# Patient Record
Sex: FEMALE | Race: WHITE | NOT HISPANIC OR LATINO | Employment: FULL TIME | URBAN - METROPOLITAN AREA
[De-identification: names, ages, dates, MRNs, and addresses within clinical notes are randomized per-mention and may not be internally consistent; named-entity substitution may affect disease eponyms.]

---

## 2022-04-21 ENCOUNTER — TELEPHONE (OUTPATIENT)
Dept: SURGERY | Facility: CLINIC | Age: 52
End: 2022-04-21

## 2022-04-21 NOTE — TELEPHONE ENCOUNTER
Attempted to contact Jessica Villanueva to discuss scheduling a consult with Dr. Payne from [facility] in regards to a Ventral Hernia.    Left voice mail to return our call at [phone] on [phone] (home).    Rg Aly MA

## 2022-07-25 ENCOUNTER — OFFICE VISIT (OUTPATIENT)
Dept: SURGERY | Facility: CLINIC | Age: 52
End: 2022-07-25
Payer: COMMERCIAL

## 2022-07-25 VITALS
WEIGHT: 280.75 LBS | SYSTOLIC BLOOD PRESSURE: 145 MMHG | DIASTOLIC BLOOD PRESSURE: 85 MMHG | OXYGEN SATURATION: 97 % | HEART RATE: 65 BPM | HEIGHT: 67 IN | BODY MASS INDEX: 44.07 KG/M2

## 2022-07-25 DIAGNOSIS — K43.2 INCISIONAL HERNIA, WITHOUT OBSTRUCTION OR GANGRENE: Primary | ICD-10-CM

## 2022-07-25 PROCEDURE — 99999 PR PBB SHADOW E&M-EST. PATIENT-LVL III: ICD-10-PCS | Mod: PBBFAC,,, | Performed by: SURGERY

## 2022-07-25 PROCEDURE — 99999 PR PBB SHADOW E&M-EST. PATIENT-LVL III: CPT | Mod: PBBFAC,,, | Performed by: SURGERY

## 2022-07-25 PROCEDURE — 99203 OFFICE O/P NEW LOW 30 MIN: CPT | Mod: S$GLB,,, | Performed by: SURGERY

## 2022-07-25 PROCEDURE — 99203 PR OFFICE/OUTPT VISIT, NEW, LEVL III, 30-44 MIN: ICD-10-PCS | Mod: S$GLB,,, | Performed by: SURGERY

## 2022-07-25 RX ORDER — NITROFURANTOIN 25; 75 MG/1; MG/1
100 CAPSULE ORAL 2 TIMES DAILY
COMMUNITY
Start: 2022-07-22

## 2022-07-25 RX ORDER — CIPROFLOXACIN AND DEXAMETHASONE 3; 1 MG/ML; MG/ML
SUSPENSION/ DROPS AURICULAR (OTIC)
COMMUNITY
Start: 2022-07-21

## 2022-07-25 RX ORDER — PANTOPRAZOLE SODIUM 20 MG/1
20 TABLET, DELAYED RELEASE ORAL
COMMUNITY

## 2022-07-25 RX ORDER — TRAZODONE HYDROCHLORIDE 50 MG/1
TABLET ORAL
COMMUNITY
Start: 2022-06-27

## 2022-07-25 RX ORDER — CELECOXIB 200 MG/1
200 CAPSULE ORAL DAILY PRN
COMMUNITY
Start: 2022-07-21

## 2022-07-25 RX ORDER — MICONAZOLE NITRATE 2 %
2 CREAM (GRAM) TOPICAL
Qty: 60 EACH | Refills: 0 | Status: SHIPPED | OUTPATIENT
Start: 2022-07-25

## 2022-07-25 NOTE — PROGRESS NOTES
"General Surgery Office Visit    SUBJECTIVE:     Chief Complaint: Ventral Hernia    History of Present Illness:  Patient is a 52 y.o. female w/ hx of midline  x 5 and at least one attempt at ventral hernia repair (states this was in ).  This resolved her issues for a few years, but her hernia came back, and lately she has been getting several SBOs a year that are related to the hernia.  All seem to have resolved without an operation.  She has been told by her primary surgeon that she "doesn't have anything to fix the mesh to", and that he would only offer surgery in an emergency.      She currently smokes 1/2 ppd  Her BMI is 44, has been trying to lose weight, but has not been successful.    Review of patient's allergies indicates:   Allergen Reactions    Latex, natural rubber        No past medical history on file.  No past surgical history on file.      Review of Systems:  ROS - Negative except above    OBJECTIVE:     Vital Signs (Most Recent)  Pulse: 65 (22 1101)  BP: (!) 145/85 (22 1101)  SpO2: 97 % (22 1101)    Physical Exam:  Physical Exam   Constitutional: She is oriented to person, place, and time. She appears well-developed and well-nourished. No distress.   HENT:   Head: Normocephalic and atraumatic.   Eyes: Pupils are equal, round, and reactive to light. Right eye exhibits no discharge. Left eye exhibits no discharge.   Cardiovascular: Normal rate and regular rhythm.   Pulmonary/Chest: Effort normal and breath sounds normal. No respiratory distress.   Abdominal:   Cannot appreciate fascial edges, but seems to have a couple different defects with large hernia sacs containing bowel.  I am unable to reduce this.     Musculoskeletal:         General: No tenderness or deformity. Normal range of motion.      Cervical back: Normal range of motion and neck supple.   Neurological: She is alert and oriented to person, place, and time.   Skin: Skin is warm and dry. Capillary refill " takes less than 2 seconds. No rash noted. She is not diaphoretic.   Psychiatric: Her behavior is normal.   Vitals reviewed.     CT: Do not have access to images, per chart review in media tab, there are three large defects containing small bowel and colon    ASSESSMENT/PLAN:   Jessica Villanueva is a 52 y.o. female w/ large incisional hernia    -She will very likely need a repair of these hernias.  Will obtain CT images to evaluate this hernia  -Advised weight loss and smoking cessation, sent in nicorette script  -Will follow up in a month    Farhat Yan MD  General Surgery, PGY-5  [phone]

## 2022-08-05 ENCOUNTER — TELEPHONE (OUTPATIENT)
Dept: SURGERY | Facility: CLINIC | Age: 52
End: 2022-08-05
Payer: COMMERCIAL

## 2022-08-05 NOTE — TELEPHONE ENCOUNTER
Patient seen by Dr. Evans on 7/25 for hernia and scheduled for follow-up appointment on 8/29. Received message requesting earlier appointment. After speaking with the patient, she states that she does not have transportation to come to [place] to see Dr. Evans but is in pain. Reports being seen in ED in [place] on 8/1/22 but is unhappy because she was not given antibiotics or pain medication. Patient states she was prescribed antibiotics at the beginning of July from outside hospital but does not know what happened to them. Patient asking for medication to help pain. Will speak with Dr. Evans regarding plan for patient and reach back out to her. Patient verbalized understanding.

## 2022-08-05 NOTE — TELEPHONE ENCOUNTER
Patient reports pain and requesting pain medication. Patient seen by Dr. Evans in July for a hernia but currently states her pain is due to open wounds on her abdomen that she is seeing wound care for. Patient informed that Dr. Evans will no be sending in prescription for pain medication and suggested that she she reach out to her wound care doctor who is currently treating her. Patient currently scheduled to follow-up with Dr. Evans for hernia at the end of August but has difficulty with transportation so she is unsure if she will be able to make her appointment. Patient informed that Dr. Evans will be leaving in September so she will likely need to follow-up with another physician. Patient states Mondays and Tuesdays are best for her but that she is waiting to hear back from insurance regarding help with transportation. Patient will call back once she figures out transportation to schedule an appointment.

## 2023-06-27 ENCOUNTER — OFFICE VISIT (OUTPATIENT)
Dept: SURGERY | Facility: CLINIC | Age: 53
End: 2023-06-27
Payer: COMMERCIAL

## 2023-06-27 VITALS
BODY MASS INDEX: 42.11 KG/M2 | DIASTOLIC BLOOD PRESSURE: 66 MMHG | SYSTOLIC BLOOD PRESSURE: 127 MMHG | WEIGHT: 268.31 LBS | HEIGHT: 67 IN | OXYGEN SATURATION: 96 % | HEART RATE: 66 BPM

## 2023-06-27 DIAGNOSIS — K43.2 RECURRENT INCISIONAL HERNIA: Primary | ICD-10-CM

## 2023-06-27 PROCEDURE — 3008F BODY MASS INDEX DOCD: CPT | Mod: CPTII,S$GLB,, | Performed by: SURGERY

## 2023-06-27 PROCEDURE — 1159F MED LIST DOCD IN RCRD: CPT | Mod: CPTII,S$GLB,, | Performed by: SURGERY

## 2023-06-27 PROCEDURE — 99999 PR PBB SHADOW E&M-EST. PATIENT-LVL III: CPT | Mod: PBBFAC,,, | Performed by: SURGERY

## 2023-06-27 PROCEDURE — 3074F SYST BP LT 130 MM HG: CPT | Mod: CPTII,S$GLB,, | Performed by: SURGERY

## 2023-06-27 PROCEDURE — 3078F DIAST BP <80 MM HG: CPT | Mod: CPTII,S$GLB,, | Performed by: SURGERY

## 2023-06-27 PROCEDURE — 99999 PR PBB SHADOW E&M-EST. PATIENT-LVL III: ICD-10-PCS | Mod: PBBFAC,,, | Performed by: SURGERY

## 2023-06-27 PROCEDURE — 3078F PR MOST RECENT DIASTOLIC BLOOD PRESSURE < 80 MM HG: ICD-10-PCS | Mod: CPTII,S$GLB,, | Performed by: SURGERY

## 2023-06-27 PROCEDURE — 1159F PR MEDICATION LIST DOCUMENTED IN MEDICAL RECORD: ICD-10-PCS | Mod: CPTII,S$GLB,, | Performed by: SURGERY

## 2023-06-27 PROCEDURE — 3008F PR BODY MASS INDEX (BMI) DOCUMENTED: ICD-10-PCS | Mod: CPTII,S$GLB,, | Performed by: SURGERY

## 2023-06-27 PROCEDURE — 99214 PR OFFICE/OUTPT VISIT, EST, LEVL IV, 30-39 MIN: ICD-10-PCS | Mod: S$GLB,,, | Performed by: SURGERY

## 2023-06-27 PROCEDURE — 3074F PR MOST RECENT SYSTOLIC BLOOD PRESSURE < 130 MM HG: ICD-10-PCS | Mod: CPTII,S$GLB,, | Performed by: SURGERY

## 2023-06-27 PROCEDURE — 99214 OFFICE O/P EST MOD 30 MIN: CPT | Mod: S$GLB,,, | Performed by: SURGERY

## 2023-06-27 NOTE — PROGRESS NOTES
History & Physical  General Surgery    SUBJECTIVE:     History of Present Illness:  Patient is a 52 y.o. female presents for evaluation of large, recurrent incisional hernia.  Has had the hernia for many years.  Complains of pain in the area.  No nausea, vomiting, constipation, or other obstructive symptoms.  Previously had it repaired and failed.  Has seen surgeons and was deemed too large incised fixed.  Has a history of morbid obesity, has lost 50 lb and is continuing to lose weight.  She is an active smoker.  Nondiabetic.      No chief complaint on file.      Review of patient's allergies indicates:   Allergen Reactions    Latex, natural rubber        Current Outpatient Medications   Medication Sig Dispense Refill    celecoxib (CELEBREX) 200 MG capsule Take 200 mg by mouth daily as needed.      ciprofloxacin-dexamethasone 0.3-0.1% (CIPRODEX) 0.3-0.1 % DrpS Place into the right ear.      nicotine, polacrilex, (NICORETTE) 2 mg Gum Take 1 each (2 mg total) by mouth as needed (cravings). 60 each 0    nitrofurantoin, macrocrystal-monohydrate, (MACROBID) 100 MG capsule Take 100 mg by mouth 2 (two) times daily.      pantoprazole (PROTONIX) 20 MG tablet Take 20 mg by mouth.      traZODone (DESYREL) 50 MG tablet Take by mouth.       No current facility-administered medications for this visit.       No past medical history on file.  No past surgical history on file.  No family history on file.        Review of Systems:  Review of Systems   Constitutional:  Negative for chills and fever.   Respiratory:  Negative for cough and shortness of breath.    Cardiovascular:  Negative for chest pain and palpitations.   Gastrointestinal:  Positive for abdominal pain. Negative for nausea and vomiting.   Genitourinary:  Negative for dysuria and hematuria.   Musculoskeletal:  Negative for back pain and gait problem.   Skin:  Negative for color change, rash and wound.   Neurological:  Negative for weakness and headaches.   Hematological:  " Negative for adenopathy. Does not bruise/bleed easily.   Psychiatric/Behavioral:  Negative for agitation and confusion.      OBJECTIVE:     Vital Signs (Most Recent)  Pulse: 66 (06/27/23 1406)  BP: 127/66 (06/27/23 1406)  SpO2: 96 % (06/27/23 1406)  5' 7" (1.702 m)  121.7 kg (268 lb 4.8 oz)     Physical Exam:  Physical Exam  Constitutional:       General: She is not in acute distress.     Appearance: Normal appearance. She is obese. She is not ill-appearing.   HENT:      Head: Normocephalic and atraumatic.   Eyes:      General: No scleral icterus.     Pupils: Pupils are equal, round, and reactive to light.   Neck:      Trachea: No tracheal deviation.   Cardiovascular:      Rate and Rhythm: Normal rate and regular rhythm.   Pulmonary:      Effort: Pulmonary effort is normal. No respiratory distress.      Breath sounds: No stridor.   Abdominal:      General: There is no distension.      Palpations: Abdomen is soft.      Tenderness: There is no abdominal tenderness.      Hernia: A hernia (Large abdominal hernia extending to pannus, nonpalpable defect.) is present.   Musculoskeletal:         General: No deformity or signs of injury.      Cervical back: No edema or erythema.   Skin:     General: Skin is warm and dry.      Coloration: Skin is not jaundiced.   Neurological:      General: No focal deficit present.      Mental Status: She is alert and oriented to person, place, and time.   Psychiatric:         Mood and Affect: Mood normal.         Behavior: Behavior normal.       ASSESSMENT/PLAN:   53-year-old woman with obesity and smoking history presents with large recurrent incisional hernia.  Hernia is likely fixable, but well require component separation and large piece of mesh.  She will also likely need panniculectomy in association with hernia repair given the amount of hernia associated with her pannus.  Has a prior CT scan at outside hospital, I will need to review these images.  If hernia is fixable by CT scan, " will refer to Eron Abad to discuss panniculectomy with hernia repair.  She is an active smoker and I told her that I will not operate as long as she is smoking.  She will be nicotine tested prior to scheduling and day of surgery.  She voiced understanding of this.  Recommend continued weight loss as this will continue to decrease postoperative complication risk.    Natanael Vigil MD  Acute Care Surgery  [facility] Department of Surgery

## 2023-07-13 ENCOUNTER — TELEPHONE (OUTPATIENT)
Dept: SURGERY | Facility: CLINIC | Age: 53
End: 2023-07-13
Payer: COMMERCIAL

## 2023-07-13 NOTE — TELEPHONE ENCOUNTER
----- Message from Maribeth Simmons sent at 7/13/2023 12:55 PM CDT -----  Regarding: Surgery Scheduling  Contact: [phone]  Calling to speak with nurse today to schedule surgery. Please call and schedule.

## 2023-07-13 NOTE — TELEPHONE ENCOUNTER
Left message on patient's voicemail that call was returned.  Waiting on images of CT Scan for review.  She will be contacted after CT Scan image review with plan of care.  Direct phone number given for her to call back with any questions or concerns.

## 2023-07-25 DIAGNOSIS — K43.2 RECURRENT INCISIONAL HERNIA: Primary | ICD-10-CM

## 2023-07-25 DIAGNOSIS — L98.7 EXCESS SKIN OF ABDOMEN: ICD-10-CM

## 2023-07-26 ENCOUNTER — TELEPHONE (OUTPATIENT)
Dept: PLASTIC SURGERY | Facility: CLINIC | Age: 53
End: 2023-07-26
Payer: COMMERCIAL

## 2023-07-26 NOTE — TELEPHONE ENCOUNTER
Spoke to pt and scheduled appt for a consult/referral from Dr Vigil to see Dr Abad on 8/22 1pm at [facility], 2nd floor. Provided patient with appointment time and date, address of the location and call back # to RN navigator. All questions and concerns addressed.

## 2023-08-22 ENCOUNTER — OFFICE VISIT (OUTPATIENT)
Dept: PLASTIC SURGERY | Facility: CLINIC | Age: 53
End: 2023-08-22
Payer: COMMERCIAL

## 2023-08-22 VITALS
BODY MASS INDEX: 43.16 KG/M2 | DIASTOLIC BLOOD PRESSURE: 66 MMHG | SYSTOLIC BLOOD PRESSURE: 127 MMHG | WEIGHT: 275 LBS | HEART RATE: 66 BPM | HEIGHT: 67 IN

## 2023-08-22 DIAGNOSIS — L98.7 EXCESS SKIN OF ABDOMEN: ICD-10-CM

## 2023-08-22 DIAGNOSIS — K43.2 RECURRENT VENTRAL INCISIONAL HERNIA: ICD-10-CM

## 2023-08-22 DIAGNOSIS — K43.2 RECURRENT INCISIONAL HERNIA: ICD-10-CM

## 2023-08-22 PROCEDURE — 3078F PR MOST RECENT DIASTOLIC BLOOD PRESSURE < 80 MM HG: ICD-10-PCS | Mod: CPTII,S$GLB,, | Performed by: SURGERY

## 2023-08-22 PROCEDURE — 99999 PR PBB SHADOW E&M-EST. PATIENT-LVL III: CPT | Mod: PBBFAC,,, | Performed by: SURGERY

## 2023-08-22 PROCEDURE — 1159F MED LIST DOCD IN RCRD: CPT | Mod: CPTII,S$GLB,, | Performed by: SURGERY

## 2023-08-22 PROCEDURE — 3074F PR MOST RECENT SYSTOLIC BLOOD PRESSURE < 130 MM HG: ICD-10-PCS | Mod: CPTII,S$GLB,, | Performed by: SURGERY

## 2023-08-22 PROCEDURE — 99203 OFFICE O/P NEW LOW 30 MIN: CPT | Mod: S$GLB,,, | Performed by: SURGERY

## 2023-08-22 PROCEDURE — 99999 PR PBB SHADOW E&M-EST. PATIENT-LVL III: ICD-10-PCS | Mod: PBBFAC,,, | Performed by: SURGERY

## 2023-08-22 PROCEDURE — 1159F PR MEDICATION LIST DOCUMENTED IN MEDICAL RECORD: ICD-10-PCS | Mod: CPTII,S$GLB,, | Performed by: SURGERY

## 2023-08-22 PROCEDURE — 3008F PR BODY MASS INDEX (BMI) DOCUMENTED: ICD-10-PCS | Mod: CPTII,S$GLB,, | Performed by: SURGERY

## 2023-08-22 PROCEDURE — 3008F BODY MASS INDEX DOCD: CPT | Mod: CPTII,S$GLB,, | Performed by: SURGERY

## 2023-08-22 PROCEDURE — 99203 PR OFFICE/OUTPT VISIT, NEW, LEVL III, 30-44 MIN: ICD-10-PCS | Mod: S$GLB,,, | Performed by: SURGERY

## 2023-08-22 PROCEDURE — 3078F DIAST BP <80 MM HG: CPT | Mod: CPTII,S$GLB,, | Performed by: SURGERY

## 2023-08-22 PROCEDURE — 3074F SYST BP LT 130 MM HG: CPT | Mod: CPTII,S$GLB,, | Performed by: SURGERY

## 2023-08-22 RX ORDER — CIPROFLOXACIN 500 MG/1
500 TABLET ORAL
COMMUNITY

## 2023-08-24 PROBLEM — K43.2 RECURRENT VENTRAL INCISIONAL HERNIA: Status: ACTIVE | Noted: 2023-08-24

## 2023-08-24 NOTE — ASSESSMENT & PLAN NOTE
At this time the patient was not a candidate for complex abdominal wall reconstruction.  She was several buried surgery.  Once her weight, wonders her smoking status.  We did discuss what she would need to do become a surgical candidate.  I told her that be considered for surgery her BMI must be less than 40.  She must quit smoking completely in use any other nicotine products, and then I would also send her for MRSA decolonization because she has a history of MRSA infection.    If she were to meet these criteria she would be a candidate for complex abdominal wall reconstruction.  This would not be a straight for operation.  I worry because of the size of the hernia in the time that she is had it she may to be short on abdominal domain.  I would recommend preoperative Botox.  We will do her hernia repair through a panniculectomy incision she has a large overhanging pannus and chronic wounds from this and then think they get her fascial closed primarily with a bilateral anterior component separation with a inlay mesh.    Patient voiced her understanding of this plan.  She was planning on moving to [place] next week and will try to establish herself with a provider up there.  I told her that if she was staying here I would recommend consult 2-0 smoking cessation and to the medical weight loss clinic.    She will follow up as needed

## 2023-08-24 NOTE — PROGRESS NOTES
Plastic Surgery History & Physical    SUBJECTIVE:   Chief complaint:  Recurrent ventral incisional hernia, overhanging pannus with wounds    History of Present Illness:  53 y.o. female with a history of ventral incisional hernia.  Her surgical history includes 5  sections.  These were all vertical lower midline incisions she also has a history of laparoscopic cholecystectomy.  She says that she is had multiple hernia surgeries 5 in total.  She 1st noticed a hernia thereafter her initial  section in .  She also states that she has a history of mesh infection and has had multiple MRSA skin infections.  She says that she has been losing some weight.  She was previously 310 lb and she was lost about 35 lb.  She was also trying to quit smoking and is told that she has a history of intra-abdominal adhesions.    Past medical history includes anxiety and depression  She is a current active everyday smoker   She was obese.    No past medical history on file.    No past surgical history on file.    No family history on file.    Social History     Socioeconomic History    Marital status:    Tobacco Use    Smoking status: Every Day     Current packs/day: 0.00     Types: Cigarettes       Current Outpatient Medications   Medication Sig Dispense Refill    ciprofloxacin HCl (CIPRO) 500 MG tablet Take 500 mg by mouth every 12 (twelve) hours.      celecoxib (CELEBREX) 200 MG capsule Take 200 mg by mouth daily as needed.      ciprofloxacin-dexamethasone 0.3-0.1% (CIPRODEX) 0.3-0.1 % DrpS Place into the right ear.      nicotine, polacrilex, (NICORETTE) 2 mg Gum Take 1 each (2 mg total) by mouth as needed (cravings). (Patient not taking: Reported on 2023) 60 each 0    nitrofurantoin, macrocrystal-monohydrate, (MACROBID) 100 MG capsule Take 100 mg by mouth 2 (two) times daily.      pantoprazole (PROTONIX) 20 MG tablet Take 20 mg by mouth.      traZODone (DESYREL) 50 MG tablet Take by mouth.       No  "current facility-administered medications for this visit.       Review of patient's allergies indicates:   Allergen Reactions    Latex, natural rubber                OBJECTIVE:     /66   Pulse 66   Ht 5' 7" (1.702 m)   Wt 124.7 kg (275 lb)   BMI 43.07 kg/m²       Physical Exam:  Gen: NAD, appears older than stated age, smells of smoke  Neuro: normal without focal findings, mental status and speech normal  HEENT: NCAT, neck supple, PEERL  CV: RRR  Pulm: Breathing non-labored, chest wall movement equal bilaterally   Breast: not examined  Abdomen: s large tender overhanging abdominal pannus.  On the left side is hernia sac.  She has some superficial sores on the lower part of her over pain pannus.  That area is tender.  It was also a darkly discolored.  She was a palpable lower midline hernia defect  Gu: genitalia not examined  Extremity:normal strength, no cyanosis or edema  Skin:  Abnormal skin findings noted above  Psych:  Normal mood and affect          ASSESSMENT/PLAN:     Recurrent ventral incisional hernia  At this time the patient was not a candidate for complex abdominal wall reconstruction.  She was several buried surgery.  Once her weight, wonders her smoking status.  We did discuss what she would need to do become a surgical candidate.  I told her that be considered for surgery her BMI must be less than 40.  She must quit smoking completely in use any other nicotine products, and then I would also send her for MRSA decolonization because she has a history of MRSA infection.    If she were to meet these criteria she would be a candidate for complex abdominal wall reconstruction.  This would not be a straight for operation.  I worry because of the size of the hernia in the time that she is had it she may to be short on abdominal domain.  I would recommend preoperative Botox.  We will do her hernia repair through a panniculectomy incision she has a large overhanging pannus and chronic wounds from " this and then think they get her fascial closed primarily with a bilateral anterior component separation with a inlay mesh.    Patient voiced her understanding of this plan.  She was planning on moving to [place] next week and will try to establish herself with a provider up there.  I told her that if she was staying here I would recommend consult 2-0 smoking cessation and to the medical weight loss clinic.    She will follow up as needed    Ty Abad, DO  Plastic and Reconstructive Surgery    I spent a total of 30 minutes on the day of the visit.  This includes face to face time and non-face to face time preparing to see the patient (eg, review of tests), obtaining and/or reviewing separately obtained history, documenting clinical information in the electronic or other health record, independently interpreting results and communicating results to the patient/family/caregiver, or care coordinator.